# Patient Record
(demographics unavailable — no encounter records)

---

## 2025-05-23 NOTE — HISTORY OF PRESENT ILLNESS
[de-identified] : 05/23/2025: Patient is a 63yo RHD male presenting for evaluation of R arm tingling that has been present for 2 months without injury.  He feels tingling into thumb and fingers. He feels soreness in his upper arm. He denies any pain. He notices it worse with certain neck positions. NSAIDs with slight improvement. He denies weakness.

## 2025-05-23 NOTE — ASSESSMENT
[FreeTextEntry1] : outside XR reviewed of C spine and R shoulder showing osteoarthritic changes in both. Shoulder exam is normal. Neck exam consistent with radiculopathy - medrol dose pack - PT referral - HEP given - Tylenol, nsaid, ice prn - Follow up in 4-6 weeks. If not improved, MRI

## 2025-05-23 NOTE — PHYSICAL EXAM
[de-identified] : Constitutional: Well developed, well nourished, able to communicate Neuro: Normal sensation, No focal deficits Skin: Intact CV: Peripheral vascular exam grossly normal Pulm: No signs of respiratory distress Psych: Oriented, normal mood and affect  Neck: - No obvious deformity, swelling, or bruising - No pain with palpation of spinous processes or paraspinal musculature - ROM limited throughout flexion, extension, side bending, and rotation - 5/5 strength throughout UE evaluation bilaterally  - Positive Spurling Maneuver R side - Full Shoulder ROM and strength bilaterally without pain - Distally neurovascularly intact